# Patient Record
Sex: FEMALE
[De-identification: names, ages, dates, MRNs, and addresses within clinical notes are randomized per-mention and may not be internally consistent; named-entity substitution may affect disease eponyms.]

---

## 2019-02-19 PROBLEM — Z00.00 ENCOUNTER FOR PREVENTIVE HEALTH EXAMINATION: Status: ACTIVE | Noted: 2019-02-19

## 2019-02-22 ENCOUNTER — APPOINTMENT (OUTPATIENT)
Dept: ORTHOPEDIC SURGERY | Facility: CLINIC | Age: 34
End: 2019-02-22
Payer: COMMERCIAL

## 2019-02-22 VITALS
WEIGHT: 119 LBS | BODY MASS INDEX: 20.32 KG/M2 | DIASTOLIC BLOOD PRESSURE: 70 MMHG | SYSTOLIC BLOOD PRESSURE: 100 MMHG | HEART RATE: 80 BPM | OXYGEN SATURATION: 98 % | HEIGHT: 64 IN

## 2019-02-22 DIAGNOSIS — S83.511A SPRAIN OF ANTERIOR CRUCIATE LIGAMENT OF RIGHT KNEE, INITIAL ENCOUNTER: ICD-10-CM

## 2019-02-22 PROCEDURE — 99203 OFFICE O/P NEW LOW 30 MIN: CPT

## 2019-02-24 PROBLEM — S83.511A RUPTURE OF ANTERIOR CRUCIATE LIGAMENT OF RIGHT KNEE, INITIAL ENCOUNTER: Status: ACTIVE | Noted: 2019-02-22

## 2019-02-24 NOTE — PHYSICAL EXAM
[Knee Swelling Right] : swelling [Knee Tenderness On Palpation Right] : tenderness [Knee Instability Laxity Right Anterior Cruciate Ligament] : positive pivot shift test [Knee Motion Right Crepitus] : no crepitus with ROM [Knee Motion Right] : limited ROM [Knee Stability / Maneuvers] : negative patellofemoral apprehension test [Knee Posterior Drawer Sign Right] : negative posterior drawer sign [Knee Tender On Palp With Quadriceps Contracted (Shrug Sign)] : negative patellar grind [Knee Medial Instability Right] : no laxity on valgus stress [Knee Lateral Instability Right] : no laxity on varus stress [de-identified] : MRI reveals complete midsubstance tear of her ACL. Menisci are intact. No other significant ligament injury.

## 2019-02-24 NOTE — HISTORY OF PRESENT ILLNESS
[de-identified] : This 33 year old woman twisted her right knee while caring for her children at the conclusion of an airplane flight. She was in a confined space and rotated her leg, injuring her knee. She felt an immediate pop.Difficulty bearing weight. \par \par No previous knee issues. \par \par She is active, but does not participate in competitive sports. She does work out at the gym regularly.\par \par She saw Dr. Jameson Ryan at the recommendation of her PMD, had an MRI, and is here for consultation.

## 2021-03-18 NOTE — DISCUSSION/SUMMARY
Called patient regarding video visit. VM box not set up, unable to lvm.    [Surgical risks reviewed] : Surgical risks reviewed [de-identified] : Jasmina has an isolated, complete ACL tear. We discussed the natural history of ACL injury. The nonoperative and operative alternatives were discussed in detail. I have asked her to complete 2-3 weeks of PT and return for follow up to further complete the plans for treatment. She is leaning toward ACL reconstruction, which is certainly a reasonable choice of treatment.